# Patient Record
Sex: MALE
[De-identification: names, ages, dates, MRNs, and addresses within clinical notes are randomized per-mention and may not be internally consistent; named-entity substitution may affect disease eponyms.]

---

## 2017-05-21 ENCOUNTER — HOSPITAL ENCOUNTER (EMERGENCY)
Dept: HOSPITAL 25 - UCCORT | Age: 57
Discharge: HOME | End: 2017-05-21
Payer: MEDICARE

## 2017-05-21 VITALS — DIASTOLIC BLOOD PRESSURE: 79 MMHG | SYSTOLIC BLOOD PRESSURE: 147 MMHG
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
## 2017-05-21 DIAGNOSIS — S80.862A: Primary | ICD-10-CM

## 2017-05-21 DIAGNOSIS — I10: ICD-10-CM

## 2017-05-21 DIAGNOSIS — Z88.0: ICD-10-CM
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
## 2017-05-21 PROCEDURE — 99211 OFF/OP EST MAY X REQ PHY/QHP: CPT

## 2017-05-21 PROCEDURE — G0463 HOSPITAL OUTPT CLINIC VISIT: HCPCS

## 2017-05-21 NOTE — UC
Bite Injury/Animal HPI





- HPI Summary


HPI Summary: 





Pt presents after removing a tick from left leg this morning. PT states was not 

there yesterday. pt was mowing and weed eating yesterday,. Pt denies pain. pt 

is not immunocompromised. Pt without any complaints. Tetanus UTD. 





Pt's medications reviewed at this visit.








- History of Current Complaint


Chief Complaint: UCSkin


Stated Complaint: TICK


Time Seen by Provider: 05/21/17 13:49


Hx Obtained From: Patient


Severity Currently: None


Pain Intensity: 0


Pain Scale Used: 0-10 Numeric


Onset/Duration: Resolved


Type of Bite: Wild Animal - tick


Character: Puncture


Associated Signs And Symptoms: Positive: Negative





- Allergies/Home Medications


Allergies/Adverse Reactions: 


 Allergies











Allergy/AdvReac Type Severity Reaction Status Date / Time


 


Penicillins Allergy Intermediate hands/feet Verified 05/21/17 13:27





   swell  











Home Medications: 


 Home Medications





Bp Medication 1 tab DAILY 05/21/17 [History Confirmed 05/21/17]











PMH/Surg Hx/FS Hx/Imm Hx


Previously Healthy: Yes


Cardiovascular History Of: Reports: Hypertension





- Surgical History


Surgical History: Yes


Surgery Procedure, Year, and Place: right ankle fusion





- Family History


Known Family History: Positive: Hypertension - father





- Social History


Occupation: Employed Full-time


Lives: With Family


Alcohol Use: Weekly


Alcohol Amount: once a week


Substance Use Type: None


Smoking Status (MU): Never Smoked Tobacco


Type: Smokeless Tobacco





Review of Systems


Constitutional: Negative


Skin: Negative


Eyes: Negative


ENT: Negative


Respiratory: Negative


Cardiovascular: Negative


Gastrointestinal: Negative


Genitourinary: Negative


Motor: Negative


Neurovascular: Negative


Musculoskeletal: Negative


Neurological: Negative


Psychological: Negative


All Other Systems Reviewed And Are Negative: Yes





Physical Exam


Triage Information Reviewed: Yes


Appearance: Well-Appearing, No Pain Distress, Well-Nourished


Vital Signs: 


 Initial Vital Signs











Temp  98.2 F   05/21/17 13:21


 


Pulse  71   05/21/17 13:21


 


Resp  16   05/21/17 13:21


 


BP  147/79   05/21/17 13:21


 


Pulse Ox  97   05/21/17 13:21











Vital Signs Reviewed: Yes


Eyes: Negative: Discharge


ENT: Positive: Hearing grossly normal


Neck exam: Normal


Neck: Positive: Supple


Respiratory: Positive: No respiratory distress


Cardiovascular: Positive: RRR, No Murmur


Abdominal Exam: Normal


Abdomen Description: Positive: Nontender, No Organomegaly, Soft


Musculoskeletal Exam: Normal


Neurological Exam: Normal


Psychological Exam: Normal


Skin Exam: Normal





Bite Injury Course/Dx





- Course


Course Of Treatment: Pt presents with tick that he removed from leg. Tick 

intact.  Attached < 24 hours. Not immunocompromised.  Tdap utd.  Tick examined  

intact, not engorged.  I reviewed CDC recommendations with pt.  prophylaxisis 

not given.  Pt comfortable and in agrement with plan.  Pts BP elevated at this 

visit. Advised f/u with PCP in 1-2 weeks





- Differential Dx/Diagnosis


Provider Diagnoses: Tick exposure





Discharge





- Discharge Plan


Condition: Stable


Disposition: HOME


Patient Education Materials:  Tick Bite (ED)


Referrals: 


MARC Conte [Primary Care Provider] - 


Additional Instructions: 





Keep area clean and dry


Check yourself daily for ticks after you have been working in the woods


Contact your doctor or return with questions or concerns








Approach to prophylaxis :


According to the Infectious Diseases Society of [place] (IDSA) guidelines that 

recommend antibiotic prophylaxis only in patients who meet all of the following 

criteria:





1.   Attached tick identified as an adult or nymphal I. scapularis tick (deer 

tick).


2.   Tick is estimated to have been attached for 36 hours (by degree of 

engorgement or time of exposure).


3.   Prophylaxis is begun within 72 hours of tick removal.





Local rate of infection of ticks with B. burgdorferi is 20 percent if attached 

for over 48 hours (these rates of infection have been shown to occur in parts 

of [place], parts of the [place], and parts of [place] and 

[place]).


If you experience a tick and time of attachment is believed to be less than 36 

hours, you may remove the tick with head intact and no need for prophylaxis.  

If over 36 hours, please come into UC.


Prophylactic doxycycline is not recommended for ticks attached less than 36 

hours.

## 2019-06-18 ENCOUNTER — HOSPITAL ENCOUNTER (EMERGENCY)
Dept: HOSPITAL 25 - UCCORT | Age: 59
Discharge: HOME | End: 2019-06-18
Payer: MEDICARE

## 2019-06-18 VITALS — SYSTOLIC BLOOD PRESSURE: 147 MMHG | DIASTOLIC BLOOD PRESSURE: 78 MMHG

## 2019-06-18 DIAGNOSIS — F17.210: ICD-10-CM

## 2019-06-18 DIAGNOSIS — Z76.0: ICD-10-CM

## 2019-06-18 DIAGNOSIS — I10: Primary | ICD-10-CM

## 2019-06-18 PROCEDURE — 99212 OFFICE O/P EST SF 10 MIN: CPT

## 2019-06-18 PROCEDURE — G0463 HOSPITAL OUTPT CLINIC VISIT: HCPCS

## 2021-02-09 NOTE — UC
General HPI





- HPI Summary


HPI Summary: 





requesting med refill.


pt. has as hx of hypertension , out of his bp meds


his PCP is in [place] 


denies any chest, pain , no palpitation, no sob  





- History of Current Complaint


Chief Complaint: UCMedRefill


Stated Complaint: MED REFILL


Time Seen by Provider: 06/18/19 11:06


Hx Obtained From: Patient


Onset/Duration: Gradual Onset, Lasting Days - 1, Still Present


Timing: Constant


Onset Severity: Mild


Current Severity: Mild


Pain Intensity: 0


Aggravating: none


Alleviating: none


Associated Signs & Symptoms: Negative: Cough, Dysuria, Edema, Fever, Headache, 

Nausea, Palpitations, Syncope, SOB





- Allergy/Home Medications


Allergies/Adverse Reactions: 


 Allergies











Allergy/AdvReac Type Severity Reaction Status Date / Time


 


Penicillins Allergy  hands and Verified 06/18/19 10:49





   feet  





   swelling  














PMH/Surg Hx/FS Hx/Imm Hx


Cardiovascular History: Hypertension





- Surgical History


Surgical History: Yes


Surgery Procedure, Year, and Place: right ankle fusion





- Family History


Known Family History: Positive: Hypertension - father





- Social History


Alcohol Use: Occasionally


Alcohol Amount: once a week


Substance Use Type: None


Smoking Status (MU): Current Some Day Smoker


Type: Smokeless Tobacco





Review of Systems


All Other Systems Reviewed And Are Negative: Yes


Constitutional: Positive: Negative


Skin: Positive: Negative


Eyes: Positive: Negative


ENT: Positive: Negative


Respiratory: Positive: Negative


Is Patient Immunocompromised?: No





Physical Exam


Triage Information Reviewed: Yes


Appearance: Well-Appearing, No Pain Distress, Well-Nourished


Vital Signs: 


 Initial Vital Signs











Temp  99.3 F   06/18/19 10:54


 


Pulse  80   06/18/19 10:54


 


Resp  22   06/18/19 10:54


 


BP  147/78   06/18/19 10:54


 


Pulse Ox  98   06/18/19 10:54











Vital Signs Reviewed: Yes


Eye Exam: Normal


Eyes: Positive: Conjunctiva Clear


ENT: Positive: Normal ENT inspection, Hearing grossly normal, Pharynx normal


Neck: Positive: Supple, Nontender, No Lymphadenopathy


Respiratory: Positive: Chest non-tender, Lungs clear, Normal breath sounds


Cardiovascular: Positive: RRR, No Murmur, Pulses Normal


Abdominal Exam: Normal


Abdomen Description: Positive: Nontender, Soft


Skin Exam: Normal





Course/Dx





- Diagnoses


Provider Diagnosis: 


 Hypertension








Discharge





- Sign-Out/Discharge


Documenting (check all that apply): Patient Departure


All imaging exams completed and their final reports reviewed: No Studies





- Discharge Plan


Condition: Stable


Disposition: HOME


Prescriptions: 


Lisinopril/HCTZ 20/12.5(NF) [Zestoretic 20/12.5(NF)] 1 tab PO DAILY #30 tab


Patient Education Materials:  Hypertension (ED)


Referrals: 


No Primary Care Phys,NOPCP [Primary Care Provider] - 





- Billing Disposition and Condition


Condition: STABLE


Disposition: Home The [place] Prescription Drug Monitoring Program was reviewed today and patient is compliant with controlled medication refills. There are no irregularities in refills noted.